# Patient Record
Sex: MALE | Race: WHITE | NOT HISPANIC OR LATINO | Employment: OTHER | URBAN - METROPOLITAN AREA
[De-identification: names, ages, dates, MRNs, and addresses within clinical notes are randomized per-mention and may not be internally consistent; named-entity substitution may affect disease eponyms.]

---

## 2017-12-01 ENCOUNTER — ALLSCRIPTS OFFICE VISIT (OUTPATIENT)
Dept: OTHER | Facility: OTHER | Age: 52
End: 2017-12-01

## 2017-12-05 NOTE — CONSULTS
Assessment  1  Change in bowel habits (787 99) (R19 4)   2  Blood in stool (578 1) (K92 1)   3  Recent weight loss (783 21) (R63 4)    Plan  Blood in stool, Change in bowel habits, Recent weight loss    · Dulcolax 5 MG Oral Tablet Delayed Release; TAKE 2 TABLET ONCE   Rx By: Hortencia Kessler; Dispense: 1 Days ; #:2 Tablet Delayed Release; Refill: 0;Blood in stool, Change in bowel habits, Recent weight loss; SAMIRA = N; Verified Transmission to [facility] #1773 Last Updated By: System, SureScripts; 12/1/2017 4:25:21 PM   · PEG-3350/Electrolytes 236 GM Oral Solution Reconstituted (Golytely); TAKE ASDIRECTED   Rx By: Hortencia Kessler; Dispense: 0 Days ; #:1 X 4000 ML Bottle; Refill: 0;For: Blood in stool, Change in bowel habits, Recent weight loss; SAMIRA = N; Verified Transmission to [facility] #2297 Last Updated By: System, SureScripts; 12/1/2017 4:25:21 PM   · COLONOSCOPY (GI, SURG); Status:Hold For - Scheduling; Requested for:[id number];    Perform:[facility]; YMN:[id number]; Ordered;in stool, Change in bowel habits, Recent weight loss; Ordered By:Genoveva Williamson; Discussion/Summary  Discussion Summary:     Rectal bleeding and painâappears to be most likely from anal fissure or hemorrhoids  Rule out colorectal lesions including one malignancy  Weight loss secondary to decreased oral intake  Bipolar disorder    Schedule for colonoscopy  High-fiber diet and take MiraLAX regularly  Patient was given instructions about the colonoscopy prep  Patient was explained about the risks and benefits of the procedure  Risks including but not limited to bleeding, infection, perforation were explained in detail  Also explained about less than 100% sensitivity with the exam and other alternatives  Advised patient to avoid straining during defecation  Advised to try sitz baths     Counseling Documentation With Imm: The patient was counseled regarding instructions for management,-- risk factor reductions,-- patient and family education,-- risks and benefits of treatment options,-- importance of compliance with treatment  Chief Complaint  Chief Complaint Free Text Note Form: bleeding      History of Present Illness  HPI: male with history of bipolar disorder reports having rectal bleeding and pain for more than year  He just moved from [facility]  He was seen by a gastroenterologist there and had colonoscopy scheduled but never had it done because of different reasons  He is complaining about painful bowel movements with rectal bleeding  He stopped eating about a year ago so that he doesn't need to have frequent bowel movements and lost about 15 pounds  Denies any abdominal pain, nausea or vomiting  Denies any heartburn or acid reflux  Denies any difficulty swallowing  History Reviewed: The history was obtained today from the patient and I agree with the documented history  Review of Systems  Complete-Male GI Adult:  Constitutional: feeling poorly,-- feeling tired-- and-- recent 15 lb weight loss, but-- no fever-- and-- no chills  Eyes: No complaints of eye pain, no red eyes, no discharge from eyes, no itchy eyes  ENT: no complaints of earache, no hearing loss, no nosebleeds, no nasal discharge, no sore throat, no hoarseness  Cardiovascular: No complaints of slow heart rate, no fast heart rate, no chest pain, no palpitations, no leg claudication, no lower extremity  Respiratory: No complaints of shortness of breath, no wheezing, no cough, no SOB on exertion, no orthopnea or PND  Gastrointestinal: as noted in HPI  Genitourinary: No complaints of dysuria, no incontinence, no hesitancy, no nocturia, no genital lesion, no testicular pain  Musculoskeletal: No complaints of arthralgia, no myalgias, no joint swelling or stiffness, no limb pain or swelling  Integumentary: No complaints of skin rash or skin lesions, no itching, no skin wound, no dry skin    Neurological: No compliants of headache, no confusion, no convulsions, no numbness or tingling, no dizziness or fainting, no limb weakness, no difficulty walking  Psychiatric: Is not suicidal, no sleep disturbances, no anxiety or depression, no change in personality, no emotional problems  Endocrine: No complaints of proptosis, no hot flashes, no muscle weakness, no erectile dysfunction, no deepening of the voice, no feelings of weakness  Hematologic/Lymphatic: No complaints of swollen glands, no swollen glands in the neck, does not bleed easily, no easy bruising  Active Problems  1  Abdominal pain (789 00) (R10 9)   2  Blood in stool (578 1) (K92 1)   3  Change in bowel habits (787 99) (R19 4)   4  Loss of appetite (783 0) (R63 0)   5  Recent weight loss (783 21) (R63 4)    Past Medical History  1  History of colonic polyps (V12 72) (Z86 010)   2  History of depression (V11 8) (Z86 59)   3  History of hypertension (V12 59) (Z86 79)  Active Problems And Past Medical History Reviewed: The active problems and past medical history were reviewed and updated today  Surgical History  1  History of Complete Colonoscopy  Surgical History Reviewed: The surgical history was reviewed and updated today  Family History  Mother    1  Denied: Family history of colon cancer   2  Denied: Family history of liver disease  Father    3  Denied: Family history of colon cancer   4  Denied: Family history of liver disease  Family History Reviewed: The family history was reviewed and updated today  Social History     · Occasional alcohol consumption (V49 89) (Z78 9)   · Smoker (305 1) (F17 200)  Social History Reviewed: The social history was reviewed and updated today  The social history was reviewed and is unchanged  Current Meds   1  Lithium Carbonate TABS; Therapy: (Recorded:30Lfk0036) to Recorded   2  TraZODone HCl TABS; Therapy: (Recorded:70Qpd1949) to Recorded  Medication List Reviewed: The medication list was reviewed and updated today  Allergies  1   No Known Drug Allergies    Vitals  Vital Signs    Recorded: 13CQH6444 04:15PM   Temperature 98 7 F   Heart Rate 88   Systolic 424   Diastolic 66   Weight 982 lb    O2 Saturation 98       Physical Exam   Constitutional  General appearance: No acute distress, well appearing and well nourished  Eyes  Conjunctiva and lids: No swelling, erythema, or discharge  Pupils and irises: Equal, round and reactive to light  Ears, Nose, Mouth, and Throat  External inspection of ears and nose: Normal    Oropharynx: Normal with no erythema, edema, exudate or lesions  Pulmonary  Respiratory effort: No increased work of breathing or signs of respiratory distress  Auscultation of lungs: Clear to auscultation, equal breath sounds bilaterally, no wheezes, no rales, no rhonci  Cardiovascular  Palpation of heart: Normal PMI, no thrills  Auscultation of heart: Normal rate and rhythm, normal S1 and S2, without murmurs  Examination of extremities for edema and/or varicosities: Normal    Carotid pulses: Normal    Abdomen  Abdomen: Non-tender, no masses  Liver and spleen: No hepatomegaly or splenomegaly  Lymphatic  Palpation of lymph nodes in neck: No lymphadenopathy  Musculoskeletal  Gait and station: Normal    Digits and nails: Normal without clubbing or cyanosis  Inspection/palpation of joints, bones, and muscles: Normal    Skin  Skin and subcutaneous tissue: Normal without rashes or lesions  Psychiatric  Orientation to person, place and time: Normal    Mood and affect: Normal          Future Appointments    Date/Time Provider Specialty Site   12/19/2017 12:00 PM HOWARD Mueller   Gastroenterology Adult [address]       Signatures   Electronically signed by : HOWARD Foy ; Dec  1 2017  4:40PM EST                       (Author)

## 2017-12-13 ENCOUNTER — ANESTHESIA EVENT (OUTPATIENT)
Dept: PERIOP | Facility: AMBULARY SURGERY CENTER | Age: 52
End: 2017-12-13
Payer: MEDICARE

## 2017-12-14 ENCOUNTER — GENERIC CONVERSION - ENCOUNTER (OUTPATIENT)
Dept: OTHER | Facility: OTHER | Age: 52
End: 2017-12-14

## 2017-12-14 DIAGNOSIS — R63.4 ABNORMAL WEIGHT LOSS: ICD-10-CM

## 2017-12-14 DIAGNOSIS — G47.00 INSOMNIA: ICD-10-CM

## 2017-12-14 DIAGNOSIS — K92.1 MELENA: ICD-10-CM

## 2017-12-14 DIAGNOSIS — Z12.5 ENCOUNTER FOR SCREENING FOR MALIGNANT NEOPLASM OF PROSTATE: ICD-10-CM

## 2017-12-14 DIAGNOSIS — Z13.220 ENCOUNTER FOR SCREENING FOR LIPOID DISORDERS: ICD-10-CM

## 2017-12-14 DIAGNOSIS — R10.9 ABDOMINAL PAIN: ICD-10-CM

## 2017-12-14 RX ORDER — TRAZODONE HYDROCHLORIDE 150 MG/1
50 TABLET ORAL
COMMUNITY
End: 2018-04-06

## 2017-12-14 RX ORDER — LITHIUM CARBONATE 300 MG
300 TABLET ORAL 3 TIMES DAILY
COMMUNITY
End: 2018-12-24

## 2017-12-14 NOTE — PRE-PROCEDURE INSTRUCTIONS
Pre-Surgery Instructions:   Medication Instructions    lithium 300 MG tablet Instructed patient per Anesthesia Guidelines   traZODone (DESYREL) 150 mg tablet Instructed patient per Anesthesia Guidelines      Pre colonoscopy instructions given

## 2017-12-19 ENCOUNTER — GENERIC CONVERSION - ENCOUNTER (OUTPATIENT)
Dept: GASTROENTEROLOGY | Facility: CLINIC | Age: 52
End: 2017-12-19

## 2017-12-19 ENCOUNTER — HOSPITAL ENCOUNTER (OUTPATIENT)
Facility: AMBULARY SURGERY CENTER | Age: 52
Setting detail: OUTPATIENT SURGERY
Discharge: HOME | End: 2017-12-19
Attending: INTERNAL MEDICINE | Admitting: INTERNAL MEDICINE
Payer: MEDICARE

## 2017-12-19 ENCOUNTER — APPOINTMENT (OUTPATIENT)
Dept: LAB | Facility: AMBULARY SURGERY CENTER | Age: 52
End: 2017-12-19
Payer: MEDICARE

## 2017-12-19 ENCOUNTER — ANESTHESIA (OUTPATIENT)
Dept: PERIOP | Facility: AMBULARY SURGERY CENTER | Age: 52
End: 2017-12-19
Payer: MEDICARE

## 2017-12-19 ENCOUNTER — GENERIC CONVERSION - ENCOUNTER (OUTPATIENT)
Dept: OTHER | Facility: OTHER | Age: 52
End: 2017-12-19

## 2017-12-19 VITALS
DIASTOLIC BLOOD PRESSURE: 61 MMHG | RESPIRATION RATE: 16 BRPM | WEIGHT: 127 LBS | OXYGEN SATURATION: 98 % | SYSTOLIC BLOOD PRESSURE: 105 MMHG | BODY MASS INDEX: 19.93 KG/M2 | HEIGHT: 67 IN | HEART RATE: 56 BPM | TEMPERATURE: 98.2 F

## 2017-12-19 DIAGNOSIS — K92.1 BLOOD IN STOOL: ICD-10-CM

## 2017-12-19 DIAGNOSIS — Z13.220 ENCOUNTER FOR SCREENING FOR LIPOID DISORDERS: ICD-10-CM

## 2017-12-19 DIAGNOSIS — G47.00 INSOMNIA: ICD-10-CM

## 2017-12-19 DIAGNOSIS — R63.4 ABNORMAL WEIGHT LOSS: ICD-10-CM

## 2017-12-19 DIAGNOSIS — R19.4 CHANGE IN BOWEL HABITS: ICD-10-CM

## 2017-12-19 DIAGNOSIS — K92.1 MELENA: ICD-10-CM

## 2017-12-19 DIAGNOSIS — Z12.5 ENCOUNTER FOR SCREENING FOR MALIGNANT NEOPLASM OF PROSTATE: ICD-10-CM

## 2017-12-19 DIAGNOSIS — R10.9 ABDOMINAL PAIN: ICD-10-CM

## 2017-12-19 DIAGNOSIS — R63.4 RECENT WEIGHT LOSS: ICD-10-CM

## 2017-12-19 LAB
ALBUMIN SERPL BCP-MCNC: 3.9 G/DL (ref 3.5–5)
ALP SERPL-CCNC: 77 U/L (ref 46–116)
ALT SERPL W P-5'-P-CCNC: 21 U/L (ref 12–78)
ANION GAP SERPL CALCULATED.3IONS-SCNC: 5 MMOL/L (ref 4–13)
AST SERPL W P-5'-P-CCNC: 16 U/L (ref 5–45)
BASOPHILS # BLD AUTO: 0.05 THOUSANDS/ΜL (ref 0–0.1)
BASOPHILS NFR BLD AUTO: 1 % (ref 0–1)
BILIRUB SERPL-MCNC: 0.55 MG/DL (ref 0.2–1)
BILIRUB UR QL STRIP: NEGATIVE
BUN SERPL-MCNC: 10 MG/DL (ref 5–25)
CALCIUM SERPL-MCNC: 9 MG/DL (ref 8.3–10.1)
CHLORIDE SERPL-SCNC: 105 MMOL/L (ref 100–108)
CHOLEST SERPL-MCNC: 173 MG/DL (ref 50–200)
CLARITY UR: NORMAL
CO2 SERPL-SCNC: 28 MMOL/L (ref 21–32)
COLOR UR: YELLOW
CREAT SERPL-MCNC: 0.73 MG/DL (ref 0.6–1.3)
EOSINOPHIL # BLD AUTO: 0.21 THOUSAND/ΜL (ref 0–0.61)
EOSINOPHIL NFR BLD AUTO: 3 % (ref 0–6)
ERYTHROCYTE [DISTWIDTH] IN BLOOD BY AUTOMATED COUNT: 13.7 % (ref 11.6–15.1)
GFR SERPL CREATININE-BSD FRML MDRD: 107 ML/MIN/1.73SQ M
GLUCOSE P FAST SERPL-MCNC: 83 MG/DL (ref 65–99)
GLUCOSE UR STRIP-MCNC: NEGATIVE MG/DL
HCT VFR BLD AUTO: 43.2 % (ref 36.5–49.3)
HDLC SERPL-MCNC: 60 MG/DL (ref 40–60)
HGB BLD-MCNC: 14.6 G/DL (ref 12–17)
HGB UR QL STRIP.AUTO: NEGATIVE
KETONES UR STRIP-MCNC: NEGATIVE MG/DL
LDLC SERPL CALC-MCNC: 96 MG/DL (ref 0–100)
LEUKOCYTE ESTERASE UR QL STRIP: NEGATIVE
LYMPHOCYTES # BLD AUTO: 1.63 THOUSANDS/ΜL (ref 0.6–4.47)
LYMPHOCYTES NFR BLD AUTO: 23 % (ref 14–44)
MCH RBC QN AUTO: 31.9 PG (ref 26.8–34.3)
MCHC RBC AUTO-ENTMCNC: 33.8 G/DL (ref 31.4–37.4)
MCV RBC AUTO: 94 FL (ref 82–98)
MONOCYTES # BLD AUTO: 0.56 THOUSAND/ΜL (ref 0.17–1.22)
MONOCYTES NFR BLD AUTO: 8 % (ref 4–12)
NEUTROPHILS # BLD AUTO: 4.7 THOUSANDS/ΜL (ref 1.85–7.62)
NEUTS SEG NFR BLD AUTO: 65 % (ref 43–75)
NITRITE UR QL STRIP: NEGATIVE
NRBC BLD AUTO-RTO: 0 /100 WBCS
PH UR STRIP.AUTO: 6 [PH] (ref 4.5–8)
PLATELET # BLD AUTO: 327 THOUSANDS/UL (ref 149–390)
PMV BLD AUTO: 8.9 FL (ref 8.9–12.7)
POTASSIUM SERPL-SCNC: 4.3 MMOL/L (ref 3.5–5.3)
PROT SERPL-MCNC: 7 G/DL (ref 6.4–8.2)
PROT UR STRIP-MCNC: NEGATIVE MG/DL
PSA SERPL-MCNC: 0.3 NG/ML (ref 0–4)
RBC # BLD AUTO: 4.58 MILLION/UL (ref 3.88–5.62)
SODIUM SERPL-SCNC: 138 MMOL/L (ref 136–145)
SP GR UR STRIP.AUTO: 1.02 (ref 1–1.03)
TRIGL SERPL-MCNC: 85 MG/DL
TSH SERPL DL<=0.05 MIU/L-ACNC: 1.93 UIU/ML (ref 0.36–3.74)
UROBILINOGEN UR QL STRIP.AUTO: 0.2 E.U./DL
WBC # BLD AUTO: 7.17 THOUSAND/UL (ref 4.31–10.16)

## 2017-12-19 PROCEDURE — G0103 PSA SCREENING: HCPCS

## 2017-12-19 PROCEDURE — 88305 TISSUE EXAM BY PATHOLOGIST: CPT | Performed by: INTERNAL MEDICINE

## 2017-12-19 PROCEDURE — 80053 COMPREHEN METABOLIC PANEL: CPT

## 2017-12-19 PROCEDURE — 80061 LIPID PANEL: CPT

## 2017-12-19 PROCEDURE — 84443 ASSAY THYROID STIM HORMONE: CPT

## 2017-12-19 PROCEDURE — 85025 COMPLETE CBC W/AUTO DIFF WBC: CPT

## 2017-12-19 PROCEDURE — 81003 URINALYSIS AUTO W/O SCOPE: CPT

## 2017-12-19 PROCEDURE — 36415 COLL VENOUS BLD VENIPUNCTURE: CPT

## 2017-12-19 RX ORDER — PROPOFOL 10 MG/ML
INJECTION, EMULSION INTRAVENOUS AS NEEDED
Status: DISCONTINUED | OUTPATIENT
Start: 2017-12-19 | End: 2017-12-19 | Stop reason: SURG

## 2017-12-19 RX ORDER — SODIUM CHLORIDE, SODIUM LACTATE, POTASSIUM CHLORIDE, CALCIUM CHLORIDE 600; 310; 30; 20 MG/100ML; MG/100ML; MG/100ML; MG/100ML
125 INJECTION, SOLUTION INTRAVENOUS CONTINUOUS
Status: DISCONTINUED | OUTPATIENT
Start: 2017-12-19 | End: 2017-12-19 | Stop reason: HOSPADM

## 2017-12-19 RX ADMIN — SODIUM CHLORIDE, SODIUM LACTATE, POTASSIUM CHLORIDE, AND CALCIUM CHLORIDE: .6; .31; .03; .02 INJECTION, SOLUTION INTRAVENOUS at 11:25

## 2017-12-19 RX ADMIN — PROPOFOL 20 MG: 10 INJECTION, EMULSION INTRAVENOUS at 11:54

## 2017-12-19 RX ADMIN — PROPOFOL 20 MG: 10 INJECTION, EMULSION INTRAVENOUS at 11:52

## 2017-12-19 RX ADMIN — PROPOFOL 20 MG: 10 INJECTION, EMULSION INTRAVENOUS at 11:48

## 2017-12-19 RX ADMIN — PROPOFOL 200 MG: 10 INJECTION, EMULSION INTRAVENOUS at 11:43

## 2017-12-19 RX ADMIN — PROPOFOL 30 MG: 10 INJECTION, EMULSION INTRAVENOUS at 11:50

## 2017-12-19 RX ADMIN — PROPOFOL 50 MG: 10 INJECTION, EMULSION INTRAVENOUS at 11:46

## 2017-12-19 RX ADMIN — LIDOCAINE HYDROCHLORIDE 100 MG: 20 INJECTION, SOLUTION INTRAVENOUS at 11:43

## 2017-12-19 NOTE — ANESTHESIA PREPROCEDURE EVALUATION
Review of Systems/Medical History  Patient summary reviewed  Chart reviewed      Cardiovascular  Exercise tolerance: good,  Hypertension controlled,    Pulmonary  Negative pulmonary ROS ,        GI/Hepatic    Bowel prep       Negative  ROS        Endo/Other  Negative endo/other ROS      GYN       Hematology  Negative hematology ROS      Musculoskeletal  Negative musculoskeletal ROS        Neurology  Negative neurology ROS      Psychology           Physical Exam    Airway    Mallampati score: II  TM Distance: >3 FB  Neck ROM: full     Dental   No notable dental hx     Cardiovascular  Rhythm: regular, Rate: normal,     Pulmonary  Breath sounds clear to auscultation,     Other Findings        Anesthesia Plan  ASA Score- 2       Anesthesia Type- IV sedation with anesthesia with ASA Monitors  Additional Monitors:   Airway Plan:           Induction- intravenous  Informed Consent- Anesthetic plan and risks discussed with patient  I personally reviewed this patient with the CRNA  Discussed and agreed on the Anesthesia Plan with the CRNA  Gato Ward

## 2017-12-19 NOTE — H&P (VIEW-ONLY)
Assessment  1  Change in bowel habits (787 99) (R19 4)   2  Blood in stool (578 1) (K92 1)   3  Recent weight loss (783 21) (R63 4)    Plan  Blood in stool, Change in bowel habits, Recent weight loss    · Dulcolax 5 MG Oral Tablet Delayed Release; TAKE 2 TABLET ONCE   Rx By: George Crooks; Dispense: 1 Days ; #:2 Tablet Delayed Release; Refill: 0;Blood in stool, Change in bowel habits, Recent weight loss; SAMIRA = N; Verified Transmission to [facility] #1437 Last Updated By: System, SureScripts; 12/1/2017 4:25:21 PM   · PEG-3350/Electrolytes 236 GM Oral Solution Reconstituted (Golytely); TAKE ASDIRECTED   Rx By: George Crooks; Dispense: 0 Days ; #:1 X 4000 ML Bottle; Refill: 0;For: Blood in stool, Change in bowel habits, Recent weight loss; SAMIRA = N; Verified Transmission to [facility] #7724 Last Updated By: System, SureScripts; 12/1/2017 4:25:21 PM   · COLONOSCOPY (GI, SURG); Status:Hold For - Scheduling; Requested for:[id number];    Perform:[facility]; [facility]:[id number]; Ordered;in stool, Change in bowel habits, Recent weight loss; Ordered By:Genoveva Williamson; Discussion/Summary  Discussion Summary:     Rectal bleeding and painâappears to be most likely from anal fissure or hemorrhoids  Rule out colorectal lesions including one malignancy  Weight loss secondary to decreased oral intake  Bipolar disorder    Schedule for colonoscopy  High-fiber diet and take MiraLAX regularly  Patient was given instructions about the colonoscopy prep  Patient was explained about the risks and benefits of the procedure  Risks including but not limited to bleeding, infection, perforation were explained in detail  Also explained about less than 100% sensitivity with the exam and other alternatives  Advised patient to avoid straining during defecation  Advised to try sitz baths     Counseling Documentation With Imm: The patient was counseled regarding instructions for management,-- risk factor reductions,-- patient and family education,-- risks and benefits of treatment options,-- importance of compliance with treatment  Chief Complaint  Chief Complaint Free Text Note Form: bleeding      History of Present Illness  HPI: male with history of bipolar disorder reports having rectal bleeding and pain for more than year  He just moved from [facility]  He was seen by a gastroenterologist there and had colonoscopy scheduled but never had it done because of different reasons  He is complaining about painful bowel movements with rectal bleeding  He stopped eating about a year ago so that he doesn't need to have frequent bowel movements and lost about 15 pounds  Denies any abdominal pain, nausea or vomiting  Denies any heartburn or acid reflux  Denies any difficulty swallowing  History Reviewed: The history was obtained today from the patient and I agree with the documented history  Review of Systems  Complete-Male GI Adult:  Constitutional: feeling poorly,-- feeling tired-- and-- recent 15 lb weight loss, but-- no fever-- and-- no chills  Eyes: No complaints of eye pain, no red eyes, no discharge from eyes, no itchy eyes  ENT: no complaints of earache, no hearing loss, no nosebleeds, no nasal discharge, no sore throat, no hoarseness  Cardiovascular: No complaints of slow heart rate, no fast heart rate, no chest pain, no palpitations, no leg claudication, no lower extremity  Respiratory: No complaints of shortness of breath, no wheezing, no cough, no SOB on exertion, no orthopnea or PND  Gastrointestinal: as noted in HPI  Genitourinary: No complaints of dysuria, no incontinence, no hesitancy, no nocturia, no genital lesion, no testicular pain  Musculoskeletal: No complaints of arthralgia, no myalgias, no joint swelling or stiffness, no limb pain or swelling  Integumentary: No complaints of skin rash or skin lesions, no itching, no skin wound, no dry skin    Neurological: No compliants of headache, no confusion, no convulsions, no numbness or tingling, no dizziness or fainting, no limb weakness, no difficulty walking  Psychiatric: Is not suicidal, no sleep disturbances, no anxiety or depression, no change in personality, no emotional problems  Endocrine: No complaints of proptosis, no hot flashes, no muscle weakness, no erectile dysfunction, no deepening of the voice, no feelings of weakness  Hematologic/Lymphatic: No complaints of swollen glands, no swollen glands in the neck, does not bleed easily, no easy bruising  Active Problems  1  Abdominal pain (789 00) (R10 9)   2  Blood in stool (578 1) (K92 1)   3  Change in bowel habits (787 99) (R19 4)   4  Loss of appetite (783 0) (R63 0)   5  Recent weight loss (783 21) (R63 4)    Past Medical History  1  History of colonic polyps (V12 72) (Z86 010)   2  History of depression (V11 8) (Z86 59)   3  History of hypertension (V12 59) (Z86 79)  Active Problems And Past Medical History Reviewed: The active problems and past medical history were reviewed and updated today  Surgical History  1  History of Complete Colonoscopy  Surgical History Reviewed: The surgical history was reviewed and updated today  Family History  Mother    1  Denied: Family history of colon cancer   2  Denied: Family history of liver disease  Father    3  Denied: Family history of colon cancer   4  Denied: Family history of liver disease  Family History Reviewed: The family history was reviewed and updated today  Social History     · Occasional alcohol consumption (V49 89) (Z78 9)   · Smoker (305 1) (F17 200)  Social History Reviewed: The social history was reviewed and updated today  The social history was reviewed and is unchanged  Current Meds   1  Lithium Carbonate TABS; Therapy: (Recorded:10Ubb1548) to Recorded   2  TraZODone HCl TABS; Therapy: (Recorded:61Sug0037) to Recorded  Medication List Reviewed: The medication list was reviewed and updated today  Allergies  1   No Known Drug Allergies    Vitals  Vital Signs    Recorded: 47FTB0837 04:15PM   Temperature 98 7 F   Heart Rate 88   Systolic 096   Diastolic 66   Weight 477 lb    O2 Saturation 98       Physical Exam   Constitutional  General appearance: No acute distress, well appearing and well nourished  Eyes  Conjunctiva and lids: No swelling, erythema, or discharge  Pupils and irises: Equal, round and reactive to light  Ears, Nose, Mouth, and Throat  External inspection of ears and nose: Normal    Oropharynx: Normal with no erythema, edema, exudate or lesions  Pulmonary  Respiratory effort: No increased work of breathing or signs of respiratory distress  Auscultation of lungs: Clear to auscultation, equal breath sounds bilaterally, no wheezes, no rales, no rhonci  Cardiovascular  Palpation of heart: Normal PMI, no thrills  Auscultation of heart: Normal rate and rhythm, normal S1 and S2, without murmurs  Examination of extremities for edema and/or varicosities: Normal    Carotid pulses: Normal    Abdomen  Abdomen: Non-tender, no masses  Liver and spleen: No hepatomegaly or splenomegaly  Lymphatic  Palpation of lymph nodes in neck: No lymphadenopathy  Musculoskeletal  Gait and station: Normal    Digits and nails: Normal without clubbing or cyanosis  Inspection/palpation of joints, bones, and muscles: Normal    Skin  Skin and subcutaneous tissue: Normal without rashes or lesions  Psychiatric  Orientation to person, place and time: Normal    Mood and affect: Normal          Future Appointments    Date/Time Provider Specialty Site   12/19/2017 12:00 PM HOWARD Fox   Gastroenterology Adult [address]       Signatures   Electronically signed by : HOWARD Ortiz ; Dec  1 2017  4:40PM EST                       (Author)

## 2017-12-19 NOTE — ANESTHESIA POSTPROCEDURE EVALUATION
Post-Op Assessment Note      CV Status:  Stable    Mental Status:  Alert and awake    Hydration Status:  Euvolemic    PONV Controlled:  Controlled    Airway Patency:  Patent and adequate    Post Op Vitals Reviewed: Yes          Staff: CRNA       Comments: Airway reflexes intact          BP   108/62   Temp      Pulse  81   Resp   15   SpO2   97%

## 2018-01-03 ENCOUNTER — GENERIC CONVERSION - ENCOUNTER (OUTPATIENT)
Dept: OTHER | Facility: OTHER | Age: 53
End: 2018-01-03

## 2018-01-23 VITALS
OXYGEN SATURATION: 98 % | TEMPERATURE: 98.7 F | DIASTOLIC BLOOD PRESSURE: 66 MMHG | WEIGHT: 128 LBS | SYSTOLIC BLOOD PRESSURE: 132 MMHG | HEART RATE: 88 BPM

## 2018-01-23 NOTE — CONSULTS
Chief Complaint    Rectal bleeding      History of Present Illness    51-year-old male with history of bipolar disorder reports having rectal bleeding and pain for more than year  He just moved from [facility]  He was seen by a gastroenterologist there and had colonoscopy scheduled but never had it done because of different reasons  He is complaining about painful bowel movements with rectal bleeding  He stopped eating about a year ago so that he doesn't need to have frequent bowel movements and lost about 15 pounds  Denies any abdominal pain, nausea or vomiting  Denies any heartburn or acid reflux  Denies any difficulty swallowing  The history was obtained today from the patient and I agree with the documented history  Review of Systems    Constitutional: feeling poorly, feeling tired and recent 15 lb weight loss, but no fever and no chills  Eyes: No complaints of eye pain, no red eyes, no discharge from eyes, no itchy eyes  ENT: no complaints of earache, no hearing loss, no nosebleeds, no nasal discharge, no sore throat, no hoarseness  Cardiovascular: No complaints of slow heart rate, no fast heart rate, no chest pain, no palpitations, no leg claudication, no lower extremity  Respiratory: No complaints of shortness of breath, no wheezing, no cough, no SOB on exertion, no orthopnea or PND  Gastrointestinal: as noted in HPI  Genitourinary: No complaints of dysuria, no incontinence, no hesitancy, no nocturia, no genital lesion, no testicular pain  Musculoskeletal: No complaints of arthralgia, no myalgias, no joint swelling or stiffness, no limb pain or swelling  Integumentary: No complaints of skin rash or skin lesions, no itching, no skin wound, no dry skin  Neurological: No compliants of headache, no confusion, no convulsions, no numbness or tingling, no dizziness or fainting, no limb weakness, no difficulty walking     Psychiatric: Is not suicidal, no sleep disturbances, no anxiety or depression, no change in personality, no emotional problems  Endocrine: No complaints of proptosis, no hot flashes, no muscle weakness, no erectile dysfunction, no deepening of the voice, no feelings of weakness  Hematologic/Lymphatic: No complaints of swollen glands, no swollen glands in the neck, does not bleed easily, no easy bruising  Active Problems    1  Abdominal pain (789 00) (R10 9)   2  Blood in stool (578 1) (K92 1)   3  Change in bowel habits (787 99) (R19 4)   4  Loss of appetite (783 0) (R63 0)   5  Recent weight loss (783 21) (R63 4)    Past Medical History    · History of colonic polyps (V12 72) (Z86 010)   · History of depression (V11 8) (Z86 59)   · History of hypertension (V12 59) (Z86 79)    The active problems and past medical history were reviewed and updated today  Surgical History    · History of Complete Colonoscopy    The surgical history was reviewed and updated today  Family History    · Denied: Family history of colon cancer   · Denied: Family history of liver disease    · Denied: Family history of colon cancer   · Denied: Family history of liver disease    The family history was reviewed and updated today  Social History    · Occasional alcohol consumption (V49 89) (Z78 9)   · Smoker (305 1) (F17 200)  The social history was reviewed and updated today  The social history was reviewed and is unchanged  Current Meds   1  Lithium Carbonate TABS; Therapy: (Recorded:48Acf6314) to Recorded   2  TraZODone HCl TABS; Therapy: (Recorded:31Fmj7006) to Recorded    The medication list was reviewed and updated today  Allergies    1  No Known Drug Allergies    Vitals   Recorded: [id number] 04:15PM   Temperature 98 7 F   Heart Rate 88   Systolic 951   Diastolic 66   Weight 452 lb    O2 Saturation 98     Physical Exam    Constitutional   General appearance: No acute distress, well appearing and well nourished      Eyes   Conjunctiva and lids: No swelling, erythema, or discharge  Pupils and irises: Equal, round and reactive to light  Ears, Nose, Mouth, and Throat   External inspection of ears and nose: Normal     Oropharynx: Normal with no erythema, edema, exudate or lesions  Pulmonary   Respiratory effort: No increased work of breathing or signs of respiratory distress  Auscultation of lungs: Clear to auscultation, equal breath sounds bilaterally, no wheezes, no rales, no rhonci  Cardiovascular   Palpation of heart: Normal PMI, no thrills  Auscultation of heart: Normal rate and rhythm, normal S1 and S2, without murmurs  Examination of extremities for edema and/or varicosities: Normal     Carotid pulses: Normal     Abdomen   Abdomen: Non-tender, no masses  Liver and spleen: No hepatomegaly or splenomegaly  Lymphatic   Palpation of lymph nodes in neck: No lymphadenopathy  Musculoskeletal   Gait and station: Normal     Digits and nails: Normal without clubbing or cyanosis  Inspection/palpation of joints, bones, and muscles: Normal     Skin   Skin and subcutaneous tissue: Normal without rashes or lesions  Psychiatric   Orientation to person, place and time: Normal     Mood and affect: Normal          Assessment    1  Change in bowel habits (787 99) (R19 4)   2  Blood in stool (578 1) (K92 1)   3  Recent weight loss (783 21) (R63 4)    Plan  Blood in stool, Change in bowel habits, Recent weight loss    · Dulcolax 5 MG Oral Tablet Delayed Release; TAKE 2 TABLET ONCE   Rx By: George Crooks; Dispense: 1 Days ; #:2 Tablet Delayed Release; Refill: 0; For: Blood in stool, Change in bowel habits, Recent weight loss; SAMIRA = N; Verified Transmission to [facility]/PHARMACY #[id number] Last Updated By: System, SureScripts; 12/1/2017 4:25:21 PM   · PEG-3350/Electrolytes 236 GM Oral Solution Reconstituted (Golytely); TAKE AS  DIRECTED   Rx By: George Crooks; Dispense: 0 Days ; #:1 X 4000 ML Bottle;  Refill: 0; For: Blood in stool, Change in bowel habits, Recent weight loss; SAMIRA = N; Verified Transmission to [facility]/PHARMACY #[id number] Last Updated By: System, Gigit; 2017 4:25:21 PM   · COLONOSCOPY (GI, SURG); Status:Hold For - Scheduling; Requested for:[id number];    Perform:[facility]; [id number]; Ordered; For:Blood in stool, Change in bowel habits, Recent weight loss; Ordered By:Genoveva Williamson; Discussion/Summary      ASSESSMENT:    #1  Rectal bleeding and pain-appears to be most likely from anal fissure or hemorrhoids  Rule out colorectal lesions including one malignancy  #2  Weight loss secondary to decreased oral intake    #3  Bipolar disorder    PLAN:    #1  Schedule for colonoscopy  #2  High-fiber diet and take MiraLAX regularly  #3  Patient was given instructions about the colonoscopy prep  #4  Patient was explained about the risks and benefits of the procedure  Risks including but not limited to bleeding, infection, perforation were explained in detail  Also explained about less than 100% sensitivity with the exam and other alternatives  #5  Advised patient to avoid straining during defecation    #6  Advised to try sitz baths  The patient was counseled regarding instructions for management, risk factor reductions, patient and family education, risks and benefits of treatment options, importance of compliance with treatment        Future Appointments    Signatures   Electronically signed by : HOWARD Live ; Dec  1 2017  4:40PM EST                       (Author)

## 2018-01-23 NOTE — RESULT NOTES
Discussion/Summary   SUMMARY OF RESULTS: Normal labs including prostate level, thyroid, red blood count (no anemia), and pretty good cholesterol numbers  No concerns  Let me know if you have any questions  --Eric Sullivan      Verified Results  (1) CBC/PLT/DIFF [id number] 12:42PM Phillip Chamberlain Order Number: [id number]     Test Name Result Flag Reference   WBC COUNT 7 17 Thousand/uL  4 31-10 16   RBC COUNT 4 58 Million/uL  3 88-5 62   HEMOGLOBIN 14 6 g/dL  12 0-17 0   HEMATOCRIT 43 2 %  36 5-49 3   MCV 94 fL  82-98   MCH 31 9 pg  26 8-34 3   MCHC 33 8 g/dL  31 4-37 4   RDW 13 7 %  11 6-15 1   MPV 8 9 fL  8 9-12 7   PLATELET COUNT 697 Thousands/uL  149-390   nRBC AUTOMATED 0 /100 WBCs     NEUTROPHILS RELATIVE PERCENT 65 %  43-75   LYMPHOCYTES RELATIVE PERCENT 23 %  14-44   MONOCYTES RELATIVE PERCENT 8 %  4-12   EOSINOPHILS RELATIVE PERCENT 3 %  0-6   BASOPHILS RELATIVE PERCENT 1 %  0-1   NEUTROPHILS ABSOLUTE COUNT 4 70 Thousands/? ??L  1 85-7 62   LYMPHOCYTES ABSOLUTE COUNT 1 63 Thousands/? ??L  0 60-4 47   MONOCYTES ABSOLUTE COUNT 0 56 Thousand/? ??L  0 17-1 22   EOSINOPHILS ABSOLUTE COUNT 0 21 Thousand/? ??L  0 00-0 61   BASOPHILS ABSOLUTE COUNT 0 05 Thousands/? ??L  0 00-0 10     (1) COMPREHENSIVE METABOLIC PANEL [id number] 65:91YW Phillip Chamberlain Order Number: [id number]     Test Name Result Flag Reference   SODIUM 138 mmol/L  136-145   POTASSIUM 4 3 mmol/L  3 5-5 3   CHLORIDE 105 mmol/L  100-108   CARBON DIOXIDE 28 mmol/L  21-32   ANION GAP (CALC) 5 mmol/L  4-13   BLOOD UREA NITROGEN 10 mg/dL  5-25   CREATININE 0 73 mg/dL  0 60-1 30   Standardized to IDMS reference method   CALCIUM 9 0 mg/dL  8 3-10 1   BILI, TOTAL 0 55 mg/dL  0 20-1 00   ALK PHOSPHATAS 77 U/L     ALT (SGPT) 21 U/L  12-78   Specimen collection should occur prior to Sulfasalazine and/or Sulfapyridine administration due to the potential for falsely depressed results     AST(SGOT) 16 U/L  5-45   Specimen collection should occur prior to Sulfasalazine administration due to the potential for falsely depressed results  ALBUMIN 3 9 g/dL  3 5-5 0   TOTAL PROTEIN 7 0 g/dL  6 4-8 2   eGFR 107 ml/min/1 73sq m     [facility] Disease Education Program recommendations are as follows:  GFR calculation is accurate only with a steady state creatinine  Chronic Kidney disease less than 60 ml/min/1 73 sq  meters  Kidney failure less than 15 ml/min/1 73 sq  meters  GLUCOSE FASTING 83 mg/dL  65-99   Specimen collection should occur prior to Sulfasalazine administration due to the potential for falsely depressed results  Specimen collection should occur prior to Sulfapyridine administration due to the potential for falsely elevated results  (1) LIPID PANEL FASTING W DIRECT LDL REFLEX [id number] 12:42PM Rosalynd Dash   TW Order Number: [id number]     Test Name Result Flag Reference   CHOLESTEROL 173 mg/dL     LDL CHOLESTEROL CALCULATED 96 mg/dL  0-100   Triglyceride:        Normal <150 mg/dl   Borderline High 150-199 mg/dl   High 200-499 mg/dl   Very High >499 mg/dl      Cholesterol:       Desirable <200 mg/dl    Borderline High 200-239 mg/dl    High >239 mg/dl      HDL Cholesterol:       High>59 mg/dL    Low <41 mg/dL      HDL Cholesterol:       High>59 mg/dL    Low <41 mg/dL      This screening LDL is a calculated result  It does not have the accuracy of the Direct Measured LDL in the monitoring of patients with hyperlipidemia and/or statin therapy  Direct Measure LDL (HXJ836) must be ordered separately in these patients  TRIGLYCERIDES 85 mg/dL  <=150   Specimen collection should occur prior to N-Acetylcysteine or Metamizole administration due to the potential for falsely depressed results  HDL,DIRECT 60 mg/dL  40-60   Specimen collection should occur prior to Metamizole administration due to the potential for falsley depressed results       (1) TSH WITH FT4 REFLEX [id number] 12:42PM Rosalynd Dash   TW Order Number: [id number]     Test Name Result Flag Reference   TSH 1 930 uIU/mL  0 358-3 740   Patients undergoing fluorescein dye angiography may retain small amounts of fluorescein in the body for 48-72 hours post procedure  Samples containing fluorescein can produce falsely depressed TSH values  If the patient had this procedure,a specimen should be resubmitted post fluorescein clearance  (1) PSA (SCREEN) (Dx V76 44 Screen for Prostate Cancer) [id number] 12:42PM Radhalawanda Dodson Order Number: [id number]     Test Name Result Flag Reference   PROSTATE SPECIFIC ANTIGEN 0 3 ng/mL  0 0-4 0   American Urological Association Guidelines define biochemical recurrence of prostate cancer as a detectable or rising PSA value post-radical prostatectomy that is greater than or equal to 0 2 ng/mL with a second confirmatory level of greater than or equal to 0 2 ng/mL       (1) URINALYSIS (will reflex a microscopy if leukocytes, occult blood, protein or nitrites are not within normal limits) [id number] 12:42PM Worthy Cockayne TW Order Number: [id number]     Test Name Result Flag Reference   COLOR Yellow     CLARITY Turbid     SPECIFIC GRAVITY UA 1 020  1 003-1 030   PH UA 6 0  4 5-8 0   LEUKOCYTE ESTERASE UA Negative  Negative   NITRITE UA Negative  Negative   PROTEIN UA Negative mg/dl  Negative   GLUCOSE UA Negative mg/dl  Negative   KETONES UA Negative mg/dl  Negative   UROBILINOGEN UA 0 2 E U /dl  0 2, 1 0 E U /dl   BILIRUBIN UA Negative  Negative   BLOOD UA Negative  Negative

## 2018-01-23 NOTE — RESULT NOTES
Discussion/Summary      Colon polyps removed came back as precancerous tubular adenoma  Next colonoscopy in 3 years  Patient to call our office for any GI symptoms  DISCUSSED WITH PATIENT  REMINDER SET  CS         Verified Results  (1) TISSUE EXAM [id number] 11:48AM Capron Brine     Test Name Result Flag Reference   LAB AP CASE REPORT (Report)     Surgical Pathology Report             Case: [id number]                   Authorizing Provider: Janet Del Cid MD     Collected:      12/19/2017 1148        Ordering Location:   [facility]    Received:      12/19/2017 [address] [place] [address]                            Pathologist:      Don Myers MD                             Specimens:  A) - Polyp, Colorectal, proximal ascending colon polyp hot snare                    B) - Polyp, Colorectal, descending colon polyp hot snare                        C) - Colon, rectal bx   LAB AP FINAL DIAGNOSIS (Report)     A  Proximal ascending colon polyp:    - Tubular adenoma  - Negative for high grade dysplasia and malignancy  C  Ascending colon polyp:    - Tubular adenoma  - Negative for high grade dysplasia and malignancy  C  Rectum, biopsy:    - No significant histologic abnormality  Electronically signed by Don Myers MD on 12/21/2017 at 11:17 AM   LAB AP SURGICAL ADDITIONAL INFORMATION (Report)     All controls performed with the immunohistochemical stains reported above   reacted appropriately  These tests were developed and their performance   characteristics determined by [address] [place] [address] [facility] or   [facility]  They may not be cleared or approved by the [place]  Food and Drug Administration  The FDA has determined that such clearance   or approval is not necessary  These tests are used for clinical purposes  They should not be regarded as investigational or for research  This   laboratory has been approved by [place] 88, designated as a high-complexity   laboratory and is qualified to perform these tests  LAB AP GROSS DESCRIPTION (Report)     A  The specimen is received in formalin, labeled with the patient's name   and hospital number, and is designated proximal ascending colon polyp? Vincent Mckeon The specimen consists of multiple tan soft tissue fragments measuring in   aggregate 0 5 x 0 5 x 0 1 centimeters  Entirely submitted  one cassettes  B  The specimen is received in formalin, labeled with the patient's name   and hospital number, and is designated descending colon polyp? Vincent Mckeon The   specimen consists of a tan-pink soft tissue fragment measuring 0 4   centimeters  Entirely submitted  one cassettes  C  The specimen is received in formalin, labeled with the patient's name   and hospital number, and is designated Rectal biopsy? Vincent Mckeon The specimen   consists of 4 tan soft tissue fragments each measuring 0 2-0 3   centimeters  Entirely submitted  one cassettes  Note: The estimated total formalin fixation time based upon information   provided by the submitting clinician and the standard processing schedule   is under 72 hours      MCrites

## 2018-01-24 VITALS
WEIGHT: 128 LBS | HEIGHT: 66 IN | BODY MASS INDEX: 20.57 KG/M2 | HEART RATE: 78 BPM | SYSTOLIC BLOOD PRESSURE: 138 MMHG | OXYGEN SATURATION: 97 % | DIASTOLIC BLOOD PRESSURE: 72 MMHG | TEMPERATURE: 98.1 F

## 2018-04-05 ENCOUNTER — TELEPHONE (OUTPATIENT)
Dept: FAMILY MEDICINE CLINIC | Facility: OTHER | Age: 53
End: 2018-04-05

## 2018-04-05 NOTE — TELEPHONE ENCOUNTER
Tried to Call patient to make sure everything is ok his voicemail is not set up at this time ~ he no showed for his appointment

## 2018-04-06 DIAGNOSIS — G47.00 INSOMNIA, UNSPECIFIED TYPE: Primary | ICD-10-CM

## 2018-04-06 RX ORDER — TRAZODONE HYDROCHLORIDE 100 MG/1
200 TABLET ORAL
Qty: 60 TABLET | Refills: 0 | Status: SHIPPED | OUTPATIENT
Start: 2018-04-06 | End: 2018-05-31 | Stop reason: SDUPTHER

## 2018-04-06 RX ORDER — TRAZODONE HYDROCHLORIDE 100 MG/1
2 TABLET ORAL
COMMUNITY
End: 2018-04-06 | Stop reason: SDUPTHER

## 2018-04-09 ENCOUNTER — OFFICE VISIT (OUTPATIENT)
Dept: FAMILY MEDICINE CLINIC | Facility: OTHER | Age: 53
End: 2018-04-09
Payer: MEDICARE

## 2018-04-09 VITALS
HEART RATE: 78 BPM | WEIGHT: 130.2 LBS | DIASTOLIC BLOOD PRESSURE: 72 MMHG | TEMPERATURE: 98.4 F | BODY MASS INDEX: 20.93 KG/M2 | SYSTOLIC BLOOD PRESSURE: 124 MMHG | OXYGEN SATURATION: 98 % | HEIGHT: 66 IN

## 2018-04-09 DIAGNOSIS — G47.00 INSOMNIA, UNSPECIFIED TYPE: ICD-10-CM

## 2018-04-09 DIAGNOSIS — R10.2 PERINEAL PAIN IN MALE: Primary | ICD-10-CM

## 2018-04-09 DIAGNOSIS — F31.30 BIPOLAR AFFECTIVE DISORDER, CURRENT EPISODE DEPRESSED, CURRENT EPISODE SEVERITY UNSPECIFIED (HCC): ICD-10-CM

## 2018-04-09 PROBLEM — K63.5 POLYP OF COLON: Status: ACTIVE | Noted: 2017-12-01

## 2018-04-09 PROBLEM — F31.9 BIPOLAR DISORDER (HCC): Status: ACTIVE | Noted: 2017-12-14

## 2018-04-09 PROCEDURE — 99214 OFFICE O/P EST MOD 30 MIN: CPT | Performed by: NURSE PRACTITIONER

## 2018-04-09 NOTE — PROGRESS NOTES
Assessment/Plan:           Problem List Items Addressed This Visit     Bipolar disorder [facility])  --Advise ER/911 for worsening depression, suicidal thoughts  He is agreeable  Relevant Orders    Ambulatory referral to Psychiatry-->additional numbers given  To let us know if he has difficulty securing an appointment      Insomnia  --Trazodone helpful, but may possibly be contributing to his pain, below  Suggest trial of 1 1/2 tabs, instead of 2    --Consider sleep specialist referral for ongoing issues  Perineal pain in male - Primary  --No readily apparent cause, although seems possibly a side effect from trazodone  Relevant Orders    Ambulatory referral to Urology-->for further evalation            Subjective:      Patient ID: Erick Weiss is a 46 y o  male  Here for routine follow-up  Seen for initial (new patient) visit last December  Moods have been about the same  Continued depression, suicidal thoughts  No plan or intent, however  Hasn't had any luck getting in with psychiatrist due to transportation issue--lives in [place], no car  No luck getting in with [facility]'s  Not currently taking lithium because it causes him headaches  Trazodone helpful for sleep  After two tabs, he's usually asleep within an hour  Generally sleeps through night uninterrupted  Somewhat rested in the morning  No recent apneic episodes  Tried sleep study a few years ago, but unable to fall asleep  Gets perineal pain at night after taking two tabs trazodone  One tablet does not cause in issue, but doesn't work at well for sleep  No pain during the day  No dysuria, frequency, urgency, hematuria  Normal PSA late last year  Tried Ambien in the past for sleep, but caused adverse mood changes  No further rectal pain, bleeding  Had colonoscopy late last year  Polyps only  ETOH: 1-2 drinks every few days  Denies heavy drinking            The following portions of the patient's history were reviewed and updated as appropriate: allergies, current medications, past family history, past medical history, past social history, past surgical history and problem list     Review of Systems   Constitutional: Negative for fatigue and unexpected weight change  HENT: Negative for sore throat  Respiratory: Negative for shortness of breath  Cardiovascular: Negative for chest pain  Gastrointestinal: Negative for abdominal pain, blood in stool, nausea and vomiting  Genitourinary:        Per HPI   Neurological: Negative for dizziness  Psychiatric/Behavioral:        Per HPI         Objective:      /72 (BP Location: Right arm, Patient Position: Sitting, Cuff Size: Standard)   Pulse 78   Temp 98 4 °F (36 9 °C) (Tympanic)   Ht 5' 6" (1 676 m)   Wt 59 1 kg (130 lb 3 2 oz)   SpO2 98%   BMI 21 01 kg/m²          Physical Exam   Constitutional: He appears well-developed  Cardiovascular: Normal rate and normal heart sounds  Pulmonary/Chest: Effort normal and breath sounds normal    Psychiatric: He has a normal mood and affect   His behavior is normal  Judgment and thought content normal

## 2018-05-01 ENCOUNTER — OFFICE VISIT (OUTPATIENT)
Dept: UROLOGY | Facility: MEDICAL CENTER | Age: 53
End: 2018-05-01
Payer: MEDICARE

## 2018-05-01 VITALS
BODY MASS INDEX: 20.56 KG/M2 | WEIGHT: 131 LBS | DIASTOLIC BLOOD PRESSURE: 80 MMHG | HEIGHT: 67 IN | SYSTOLIC BLOOD PRESSURE: 132 MMHG

## 2018-05-01 DIAGNOSIS — R10.2 PERINEAL PAIN IN MALE: ICD-10-CM

## 2018-05-01 DIAGNOSIS — N40.1 BENIGN PROSTATIC HYPERPLASIA WITH WEAK URINARY STREAM: Primary | ICD-10-CM

## 2018-05-01 DIAGNOSIS — R39.12 BENIGN PROSTATIC HYPERPLASIA WITH WEAK URINARY STREAM: Primary | ICD-10-CM

## 2018-05-01 LAB
SL AMB  POCT GLUCOSE, UA: NORMAL
SL AMB LEUKOCYTE ESTERASE,UA: NORMAL
SL AMB POCT BILIRUBIN,UA: NORMAL
SL AMB POCT BLOOD,UA: NORMAL
SL AMB POCT CLARITY,UA: CLEAR
SL AMB POCT COLOR,UA: YELLOW
SL AMB POCT KETONES,UA: NORMAL
SL AMB POCT NITRITE,UA: NORMAL
SL AMB POCT PH,UA: 6
SL AMB POCT SPECIFIC GRAVITY,UA: 1.02
SL AMB POCT URINE PROTEIN: NORMAL
SL AMB POCT UROBILINOGEN: 0.2

## 2018-05-01 PROCEDURE — 99204 OFFICE O/P NEW MOD 45 MIN: CPT | Performed by: UROLOGY

## 2018-05-01 PROCEDURE — 81003 URINALYSIS AUTO W/O SCOPE: CPT | Performed by: UROLOGY

## 2018-05-01 RX ORDER — TAMSULOSIN HYDROCHLORIDE 0.4 MG/1
0.4 CAPSULE ORAL EVERY EVENING
Qty: 30 CAPSULE | Refills: 1 | Status: SHIPPED | OUTPATIENT
Start: 2018-05-01 | End: 2018-12-24

## 2018-05-01 NOTE — PATIENT INSTRUCTIONS
Tamsulosin (By mouth)   Tamsulosin Hydrochloride (hmv-XRL-ktm-sin denzel-droe-KLOR-pao)  Treats benign prostatic hyperplasia (enlarged prostate)  Brand Name(s): Flomax   There may be other brand names for this medicine  When This Medicine Should Not Be Used: This medicine is not right for everyone  Do not use it if you had an allergic reaction to tamsulosin  How to Use This Medicine:   Capsule  · Take your medicine as directed  Your dose may need to be changed several times to find what works best for you  · Take this medicine 30 minutes after the same meal each day  Swallow the capsule whole  Do not crush, chew, or open it  · Read and follow the patient instructions that come with this medicine  Talk to your doctor or pharmacist if you have any questions  · Missed dose: Take a dose as soon as you remember  If it is almost time for your next dose, wait until then and take a regular dose  Do not take extra medicine to make up for a missed dose  If you forget to take this medicine for several days in a row, talk to your doctor before you start taking it again  · Store the medicine in a closed container at room temperature, away from heat, moisture, and direct light  Drugs and Foods to Avoid:   Ask your doctor or pharmacist before using any other medicine, including over-the-counter medicines, vitamins, and herbal products  · Some medicines can affect how tamsulosin works  Tell your doctor if you are using another alpha blocker medicine, cimetidine, erythromycin, ketoconazole, paroxetine, terbinafine, warfarin, or medicine for erectile dysfunction  Warnings While Using This Medicine:   · Tell your doctor if you have kidney disease, liver disease, low blood pressure, prostate cancer, or an allergy to sulfa drugs  · Tell your doctor if you plan to have cataract or glaucoma surgery  This medicine may cause eye problems during surgery  · This medicine may make you dizzy or lightheaded   Do not drive or do anything else that could be dangerous until you know how this medicine affects you  Stand or sit up slowly if you feel dizzy  · Your doctor will check your progress and the effects of this medicine at regular visits  Keep all appointments  · Keep all medicine out of the reach of children  Never share your medicine with anyone  Possible Side Effects While Using This Medicine:   Call your doctor right away if you notice any of these side effects:  · Allergic reaction: Itching or hives, swelling in your face or hands, swelling or tingling in your mouth or throat, chest tightness, trouble breathing  · Blistering, peeling, red skin rash  · Lightheadedness, dizziness, fainting  · Painful, prolonged erection of your penis  If you notice these less serious side effects, talk with your doctor:   · Headache  · Problems with ejaculation  · Runny or stuffy nose  If you notice other side effects that you think are caused by this medicine, tell your doctor  Call your doctor for medical advice about side effects  You may report side effects to FDA at 3-931-FDA-3360  © 2017 [address] [employer] Information is for End User's use only and may not be sold, redistributed or otherwise used for commercial purposes  The above information is an  only  It is not intended as medical advice for individual conditions or treatments  Talk to your doctor, nurse or pharmacist before following any medical regimen to see if it is safe and effective for you

## 2018-05-01 NOTE — PROGRESS NOTES
Assessment/Plan:    Perineal pain in male  Etiology of this pain is uncertain however does appear to be related to trazodone use  Urinalysis is negative and PSA is normal   The patient notes that his options are limited in regard to substituting other medication for the trazodone  I recommended a trial of tamsulosin to see if alpha blockade helps with his symptoms  If tamsulosin does not help he might benefit from pelvic floor physical therapy  The use and side effects of tamsulosin were discussed  The patient will try the medication and return in 2 months for follow-up  Diagnoses and all orders for this visit:    Benign prostatic hyperplasia with weak urinary stream  -     tamsulosin (FLOMAX) 0 4 mg; Take 1 capsule (0 4 mg total) by mouth every evening    Perineal pain in male  -     Ambulatory referral to Urology  -     POCT urine dip auto non-scope  -     tamsulosin (FLOMAX) 0 4 mg; Take 1 capsule (0 4 mg total) by mouth every evening          Subjective:      Patient ID: Tamara Scales is a 46 y o  male  29-year-old male seen for evaluation of perineal pain  He has a history of bipolar disorder and is on lithium  He takes trazodone to help him sleep  With the trazodone he notes severe perineal pain  He describes the pain as knife-like  It affects the entire perineum and anal region  The pain can last 45 min  He is generally then able to fall asleep  The pain is less if he reduces the dosage of the trazodone  However on lower doses of trazodone he is not able to sleep  He notes he voids with a slow stream   He feels he empties his bladder well  He does not get up at night to urinate  There is no gross hematuria, dysuria or symptoms of infection  He has no flank pain          The following portions of the patient's history were reviewed and updated as appropriate: allergies, current medications, past family history, past medical history, past social history, past surgical history and problem list     Review of Systems   Constitutional: Negative for chills, diaphoresis, fatigue and fever  HENT: Negative  Eyes: Negative  Respiratory: Negative  Cardiovascular: Negative  Gastrointestinal: Positive for rectal pain  Pain after BM   Endocrine: Negative  Musculoskeletal: Negative  Skin: Negative  Allergic/Immunologic: Negative  Neurological: Negative  Hematological: Negative  Psychiatric/Behavioral: Positive for sleep disturbance  The patient is nervous/anxious  Objective:      /80 (BP Location: Left arm, Patient Position: Sitting)   Ht 5' 7" (1 702 m)   Wt 59 4 kg (131 lb)   BMI 20 52 kg/m²          Physical Exam   Constitutional: He is oriented to person, place, and time  He appears well-developed and well-nourished  HENT:   Head: Normocephalic and atraumatic  Eyes: Conjunctivae are normal    Neck: Neck supple  Cardiovascular: Normal rate  Pulmonary/Chest: Effort normal    Abdominal: Soft  Bowel sounds are normal  He exhibits no distension and no mass  There is no tenderness  There is no rebound, no guarding and no CVA tenderness  Hernia confirmed negative in the right inguinal area and confirmed negative in the left inguinal area  Genitourinary: Rectum normal, testes normal and penis normal  Prostate is enlarged  Prostate is not tender  Right testis shows no mass  Left testis shows no mass  No phimosis or hypospadias  Genitourinary Comments: Perineum is unremarkable  Prostate exam is not well tolerated but the prostate is palpably benign and free of induration  No bogginess   Musculoskeletal: He exhibits no edema  Neurological: He is alert and oriented to person, place, and time  Skin: Skin is warm and dry  Psychiatric: He has a normal mood and affect  His behavior is normal  Judgment and thought content normal    Vitals reviewed

## 2018-05-01 NOTE — ASSESSMENT & PLAN NOTE
Etiology of this pain is uncertain however does appear to be related to trazodone use  Urinalysis is negative and PSA is normal   The patient notes that his options are limited in regard to substituting other medication for the trazodone  I recommended a trial of tamsulosin to see if alpha blockade helps with his symptoms  If tamsulosin does not help he might benefit from pelvic floor physical therapy  The use and side effects of tamsulosin were discussed  The patient will try the medication and return in 2 months for follow-up

## 2018-05-01 NOTE — LETTER
May 1, 2018     Luisa Chris, [address]  [id number] [place] [address]    Patient: Tamara Scales   YOB: 1965   Date of Visit: 5/1/2018       Dear Dr Phil Rodriguez: Thank you for referring Tamara Scales to me for evaluation  Below are my notes for this consultation  If you have questions, please do not hesitate to call me  I look forward to following your patient along with you  Sincerely,        Soumya London MD        CC: No Recipients  Soumya London MD  5/1/2018  7:24 PM  Sign at close encounter  Assessment/Plan:    Perineal pain in male  Etiology of this pain is uncertain however does appear to be related to trazodone use  Urinalysis is negative and PSA is normal   The patient notes that his options are limited in regard to substituting other medication for the trazodone  I recommended a trial of tamsulosin to see if alpha blockade helps with his symptoms  If tamsulosin does not help he might benefit from pelvic floor physical therapy  The use and side effects of tamsulosin were discussed  The patient will try the medication and return in 2 months for follow-up  Diagnoses and all orders for this visit:    Benign prostatic hyperplasia with weak urinary stream  -     tamsulosin (FLOMAX) 0 4 mg; Take 1 capsule (0 4 mg total) by mouth every evening    Perineal pain in male  -     Ambulatory referral to Urology  -     POCT urine dip auto non-scope  -     tamsulosin (FLOMAX) 0 4 mg; Take 1 capsule (0 4 mg total) by mouth every evening          Subjective:      Patient ID: Tamara Scales is a 46 y o  male  43-year-old male seen for evaluation of perineal pain  He has a history of bipolar disorder and is on lithium  He takes trazodone to help him sleep  With the trazodone he notes severe perineal pain  He describes the pain as knife-like  It affects the entire perineum and anal region  The pain can last 45 min  He is generally then able to fall asleep    The pain is less if he reduces the dosage of the trazodone  However on lower doses of trazodone he is not able to sleep  He notes he voids with a slow stream   He feels he empties his bladder well  He does not get up at night to urinate  There is no gross hematuria, dysuria or symptoms of infection  He has no flank pain  The following portions of the patient's history were reviewed and updated as appropriate: allergies, current medications, past family history, past medical history, past social history, past surgical history and problem list     Review of Systems   Constitutional: Negative for chills, diaphoresis, fatigue and fever  HENT: Negative  Eyes: Negative  Respiratory: Negative  Cardiovascular: Negative  Gastrointestinal: Positive for rectal pain  Pain after BM   Endocrine: Negative  Musculoskeletal: Negative  Skin: Negative  Allergic/Immunologic: Negative  Neurological: Negative  Hematological: Negative  Psychiatric/Behavioral: Positive for sleep disturbance  The patient is nervous/anxious  Objective:      /80 (BP Location: Left arm, Patient Position: Sitting)   Ht 5' 7" (1 702 m)   Wt 59 4 kg (131 lb)   BMI 20 52 kg/m²           Physical Exam   Constitutional: He is oriented to person, place, and time  He appears well-developed and well-nourished  HENT:   Head: Normocephalic and atraumatic  Eyes: Conjunctivae are normal    Neck: Neck supple  Cardiovascular: Normal rate  Pulmonary/Chest: Effort normal    Abdominal: Soft  Bowel sounds are normal  He exhibits no distension and no mass  There is no tenderness  There is no rebound, no guarding and no CVA tenderness  Hernia confirmed negative in the right inguinal area and confirmed negative in the left inguinal area  Genitourinary: Rectum normal, testes normal and penis normal  Prostate is enlarged  Prostate is not tender  Right testis shows no mass  Left testis shows no mass   No phimosis or hypospadias  Genitourinary Comments: Perineum is unremarkable  Prostate exam is not well tolerated but the prostate is palpably benign and free of induration  No bogginess   Musculoskeletal: He exhibits no edema  Neurological: He is alert and oriented to person, place, and time  Skin: Skin is warm and dry  Psychiatric: He has a normal mood and affect  His behavior is normal  Judgment and thought content normal    Vitals reviewed

## 2018-05-31 DIAGNOSIS — G47.00 INSOMNIA, UNSPECIFIED TYPE: ICD-10-CM

## 2018-06-01 RX ORDER — TRAZODONE HYDROCHLORIDE 100 MG/1
200 TABLET ORAL
Qty: 60 TABLET | Refills: 0 | Status: SHIPPED | OUTPATIENT
Start: 2018-06-01 | End: 2018-12-24 | Stop reason: SDUPTHER

## 2018-12-24 ENCOUNTER — OFFICE VISIT (OUTPATIENT)
Dept: FAMILY MEDICINE CLINIC | Facility: OTHER | Age: 53
End: 2018-12-24
Payer: MEDICARE

## 2018-12-24 VITALS
HEART RATE: 75 BPM | SYSTOLIC BLOOD PRESSURE: 174 MMHG | OXYGEN SATURATION: 98 % | HEIGHT: 66 IN | TEMPERATURE: 98.1 F | BODY MASS INDEX: 22.51 KG/M2 | WEIGHT: 140.06 LBS | DIASTOLIC BLOOD PRESSURE: 94 MMHG

## 2018-12-24 DIAGNOSIS — Z13.220 SCREENING FOR HYPERLIPIDEMIA: ICD-10-CM

## 2018-12-24 DIAGNOSIS — K63.5 POLYP OF COLON, UNSPECIFIED PART OF COLON, UNSPECIFIED TYPE: ICD-10-CM

## 2018-12-24 DIAGNOSIS — Z13.1 SCREENING FOR DIABETES MELLITUS: ICD-10-CM

## 2018-12-24 DIAGNOSIS — F31.30 BIPOLAR AFFECTIVE DISORDER, CURRENT EPISODE DEPRESSED, CURRENT EPISODE SEVERITY UNSPECIFIED (HCC): ICD-10-CM

## 2018-12-24 DIAGNOSIS — Z12.5 SCREENING FOR PROSTATE CANCER: ICD-10-CM

## 2018-12-24 DIAGNOSIS — G47.00 INSOMNIA, UNSPECIFIED TYPE: ICD-10-CM

## 2018-12-24 DIAGNOSIS — Z11.59 NEED FOR HEPATITIS C SCREENING TEST: ICD-10-CM

## 2018-12-24 DIAGNOSIS — I10 ESSENTIAL HYPERTENSION: ICD-10-CM

## 2018-12-24 DIAGNOSIS — Z28.21 VACCINATION REFUSED BY PATIENT: Primary | ICD-10-CM

## 2018-12-24 PROCEDURE — 99214 OFFICE O/P EST MOD 30 MIN: CPT | Performed by: NURSE PRACTITIONER

## 2018-12-24 RX ORDER — TRAZODONE HYDROCHLORIDE 100 MG/1
200 TABLET ORAL
Qty: 60 TABLET | Refills: 0 | Status: CANCELLED | OUTPATIENT
Start: 2018-12-24

## 2018-12-24 RX ORDER — TRAZODONE HYDROCHLORIDE 50 MG/1
TABLET ORAL
Qty: 60 TABLET | Refills: 1 | Status: SHIPPED | OUTPATIENT
Start: 2018-12-24

## 2018-12-24 RX ORDER — LISINOPRIL 20 MG/1
20 TABLET ORAL DAILY
Qty: 30 TABLET | Refills: 3 | Status: SHIPPED | OUTPATIENT
Start: 2018-12-24

## 2018-12-24 NOTE — PROGRESS NOTES
Assessment and Plan:    Problem List Items Addressed This Visit     None        Health Maintenance Due   Topic Date Due    Medicare Annual Wellness Visit (AWV)  1965    Pneumococcal PPSV23 Medium Risk Adult (1 of 1 - PPSV23) 12/31/1984    DTaP,Tdap,and Td Vaccines (1 - Tdap) 12/31/1986    INFLUENZA VACCINE  07/01/2018         HPI:  Jutsin Issa is a 46 y o  male here for his {AWV Welcome/Initial/Subsequent:84644}    Patient Active Problem List   Diagnosis    B[facility] disorder ([facility] [address] )    Insomnia    Polyp of colon    Perineal pain in male     Past Medical History:   Diagnosis Date    Bipolar disorder ([facility] [address] )     LAST ASSESSED 12/14/2017    Depression     Hypertension     Insomnia     LAST ASSESSED 12/14/2017    PTSD (post-traumatic stress disorder)     PTSD (post-traumatic stress disorder)     LAST ASSESSED 12/14/2017     Past Surgical History:   Procedure Laterality Date    COLONOSCOPY      EAR SURGERY Bilateral     OK COLONOSCOPY FLX DX W/COLLJ SPEC WHEN PFRMD N/A 12/19/2017    Procedure: COLONOSCOPY;  Surgeon: Billie Felix MD;  [facility]; Service: Gastroenterology     Family History   Problem Relation Age of Onset    Hypertension Mother     Hypertension Father      History   Smoking Status    Current Every Day Smoker    Packs/day: 0 50    Years: 40 00   Smokeless Tobacco    Current User     Comment: SMOKER     History   Alcohol Use    Yes     Comment: occasionally      History   Drug Use No       Current Outpatient Prescriptions   Medication Sig Dispense Refill    lithium 300 MG tablet Take 300 mg by mouth 3 (three) times a day      tamsulosin (FLOMAX) 0 4 mg Take 1 capsule (0 4 mg total) by mouth every evening 30 capsule 1    traZODone (DESYREL) 100 mg tablet TAKE 2 TABLETS (200 MG TOTAL) BY MOUTH DAILY AT BEDTIME 60 tablet 0     No current facility-administered medications for this visit        No Known Allergies    There is no immunization history on file for this patient  Patient Care Team:  June Garrett as PCP - General (Family Medicine)  Karla Viveros MD    Medicare Screening Tests and Risk Assessments:  Albert Conde is here for his Subsequent Wellness visit  Health Risk Assessment:  Patient rates overall health as poor  Patient feels that their physical health rating is Slightly worse  Eyesight was rated as Slightly worse  Hearing was rated as Same  Patient feels that their emotional and mental health rating is Much worse  Pain experienced by patient in the last 7 days has been None  Emotional/Mental Health:  Patient has been feeling nervous/anxious  Broken Bones/Falls: Fall Risk Assessment:    In the past year, patient has experienced: No history of falling in past year          Bladder/Bowel:  Patient has not leaked urine accidently in the last six months  Patient reports no loss of bowel control  Immunizations:  Patient has not had a flu vaccination within the last year  Patient has not received a pneumonia shot  Patient has not received a shingles shot  Home Safety:  Patient does not have trouble with stairs inside or outside of their home  Patient currently reports that there are no safety hazards present in home, working smoke alarms, working carbon monoxide detectors  Preventative Screenings:   prostate cancer screen performed, colon cancer screen completed,     Nutrition:  Current diet: Unhealthy with servings of the following:    Medications:  Patient is not currently taking any over-the-counter supplements  Patient is able to manage medications  Lifestyle Choices:  Patient reports current tobacco use  Patient reports no alcohol use  Patient does not drive a vehicle  Patient wears seat belt  Current level of exercise of physical activity described by patient as: Walks          Activities of Daily Living:  Can get out of bed by his or her self, able to dress self, able to make own meals, unable to do own shopping, can do own laundry/housekeeping, can manage own money, pay bills and track expenses    Previous Hospitalizations:  Hospitalization or ED visit in past 12 months  Additional Comments: Last treatment center in [place] for [place]    Advanced Directives:  Patient has not decided on power of   Patient has not completed advanced directive

## 2018-12-24 NOTE — PROGRESS NOTES
Assessment/Plan:         Problem List Items Addressed This Visit     Bipolar disorder ([facility] [address] )  --On numerous meds in the past with little/no benefit (per patient)  --Saw psychiatrist earlier this year, but no additional treatment offered  --Referred to  psychiatry, in-office counsellor  Discussed [facility] which he will consider  --Has plan in place to seek help for worsening SI  Willing to go to ER if necessary  Other Relevant Orders    Ambulatory referral to Psychiatry    Ambulatory referral to Psychology    Insomnia    Relevant Medications    traZODone (DESYREL) 50 mg tablet: 1-2 tab qhs PRN    Hypertension  --Home BP readings encouraged  --Slip given for yearly labs  --Continue regular walking  Smoking cessation will help  Chantix is unfortunately not an option d/t mental health issues    --RTO 1 month for recheck  Relevant Medications   lisinopril (ZESTRIL) 20 mg tablet   Other Relevant Orders   CBC and differential   Comprehensive metabolic panel   TSH, 3rd generation with Free T4 reflex       Polyp of colon  --UTD with colonoscopy        Other Visit Diagnoses     Vaccination refused by patient    -  Primary    Relevant Orders    PREFERRED: influenza vaccine, [id number], quadrivalent, recombinant, PF, 0 5 mL, for patients 18 yr+ (FLUBLOK)    PNEUMOCOCCAL POLYSACCHARIDE VACCINE 23-VALENT =>1YO SQ IM    Need for hepatitis C screening test        Relevant Orders    Hepatitis C antibody    Screening for prostate cancer        Relevant Orders    PSA, Total Screen    Screening for hyperlipidemia        Relevant Orders    Lipid Panel with Direct LDL reflex    Screening for diabetes mellitus        Relevant Orders    CBC and differential    Comprehensive metabolic panel    Hemoglobin A1C    Urinalysis with reflex to microscopic            Subjective:      Patient ID: Willa Young is a 46 y o  male  Here for follow-up  Ongoing depression/anxiety   Saw psychiatrist earlier this year, but was told "nothing we can do for you"  No meds given  Bad living situation which he feels is significantly exacerbating things  His landlord is demanding and critical    Hopes to move to [place] with his mother soon, which he thinks will help greatly  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  Walks regularly which helps  Admits to ongoing suicidal thoughts, but no worse lately  Denies intent  Couple beers a week  No other alcohol  No reported drug use  Trazodone helpful for sleep  Would like refill  Adamantly denies intent to harm himself by taking pills  Previous hypertension  Last took meds a couple years ago  Forgot name  No headaches, dizziness, CP, palpitations  Continues to smoke  No further perineal pain  Colonoscopy earlier this year  Polyps  The following portions of the patient's history were reviewed and updated as appropriate: current medications, past family history, past medical history, past social history, past surgical history and problem list     Review of Systems   Respiratory: Negative for shortness of breath  Cardiovascular: Negative for chest pain and palpitations  Gastrointestinal: Negative for abdominal pain  Neurological: Negative for headaches  Psychiatric/Behavioral:        Per HPI         Objective:      /92 (BP Location: Right arm, Patient Position: Sitting, Cuff Size: Adult)   Pulse 75   Temp 98 1 °F (36 7 °C) (Tympanic)   Ht 5' 6" (1 676 m)   Wt 63 5 kg (140 lb 1 oz)   SpO2 98%   BMI 22 61 kg/m²          Physical Exam   Constitutional: He is oriented to person, place, and time  He appears well-developed  Cardiovascular: Normal rate and regular rhythm  Pulmonary/Chest: Effort normal and breath sounds normal    Neurological: He is alert and oriented to person, place, and time  Psychiatric: He has a normal mood and affect   His behavior is normal  Judgment and thought content normal

## 2018-12-24 NOTE — PATIENT INSTRUCTIONS

## 2018-12-24 NOTE — PROGRESS NOTES
Assessment/Plan:    No problem-specific Assessment & Plan notes found for this encounter  {Assess/PlanSmartLinks:41544}      Subjective:      Patient ID: Romana Linker is a 46 y o  male      HPI    {Common ambulatory SmartLinks:43713}    Review of Systems      Objective:      /92 (BP Location: Right arm, Patient Position: Sitting, Cuff Size: Adult)   Pulse 75   Temp 98 1 °F (36 7 °C) (Tympanic)   Ht 5' 6" (1 676 m)   Wt 63 5 kg (140 lb 1 oz)   SpO2 98%   BMI 22 61 kg/m²          Physical Exam